# Patient Record
Sex: FEMALE | NOT HISPANIC OR LATINO | URBAN - METROPOLITAN AREA
[De-identification: names, ages, dates, MRNs, and addresses within clinical notes are randomized per-mention and may not be internally consistent; named-entity substitution may affect disease eponyms.]

---

## 2017-08-09 ENCOUNTER — CHARTING TRANS (OUTPATIENT)
Dept: URGENT CARE | Age: 67
End: 2017-08-09

## 2017-10-09 ENCOUNTER — LAB SERVICES (OUTPATIENT)
Dept: OTHER | Age: 67
End: 2017-10-09

## 2017-10-09 ENCOUNTER — CHARTING TRANS (OUTPATIENT)
Dept: OTHER | Age: 67
End: 2017-10-09

## 2017-10-09 LAB
BILIRUBIN URINE: NEGATIVE
BLOOD URINE: NEGATIVE
CLARITY: ABNORMAL
COLOR: ABNORMAL
GLUCOSE QUALITATIVE U: NEGATIVE
KETONES, URINE: NEGATIVE
LEUKOCYTE ESTERASE URINE: NEGATIVE
NITRITE URINE: NEGATIVE
PH URINE: 6 (ref 5–7)
SPECIFIC GRAVITY URINE: 1.02 (ref 1–1.03)
URINE PROTEIN, QUAL (DIPSTICK): NEGATIVE
UROBILINOGEN URINE: 0.2

## 2017-10-09 ASSESSMENT — PAIN SCALES - GENERAL: PAINLEVEL_OUTOF10: 5

## 2018-01-31 ENCOUNTER — CHARTING TRANS (OUTPATIENT)
Dept: OTHER | Age: 68
End: 2018-01-31

## 2018-01-31 ASSESSMENT — PAIN SCALES - GENERAL: PAINLEVEL_OUTOF10: 0

## 2018-11-28 VITALS
OXYGEN SATURATION: 96 % | RESPIRATION RATE: 18 BRPM | HEART RATE: 74 BPM | SYSTOLIC BLOOD PRESSURE: 134 MMHG | DIASTOLIC BLOOD PRESSURE: 90 MMHG | TEMPERATURE: 97.4 F

## 2018-11-28 VITALS
OXYGEN SATURATION: 97 % | SYSTOLIC BLOOD PRESSURE: 150 MMHG | HEART RATE: 91 BPM | RESPIRATION RATE: 20 BRPM | DIASTOLIC BLOOD PRESSURE: 90 MMHG | TEMPERATURE: 99.1 F

## 2019-03-06 VITALS
HEART RATE: 76 BPM | TEMPERATURE: 97 F | RESPIRATION RATE: 18 BRPM | DIASTOLIC BLOOD PRESSURE: 77 MMHG | OXYGEN SATURATION: 99 % | SYSTOLIC BLOOD PRESSURE: 145 MMHG

## 2020-01-21 PROBLEM — E66.01 CLASS 3 SEVERE OBESITY WITH SERIOUS COMORBIDITY AND BODY MASS INDEX (BMI) OF 40.0 TO 44.9 IN ADULT (HCC): Status: ACTIVE | Noted: 2019-01-22

## 2020-01-21 PROBLEM — Z88.9 H/O SEASONAL ALLERGIES: Status: ACTIVE | Noted: 2019-10-30

## 2020-01-21 PROBLEM — M26.622 ARTHRALGIA OF LEFT TEMPOROMANDIBULAR JOINT: Status: ACTIVE | Noted: 2019-10-30

## 2020-01-21 PROBLEM — E78.2 MIXED HYPERLIPIDEMIA: Status: ACTIVE | Noted: 2019-01-22

## 2020-01-21 PROBLEM — I10 ESSENTIAL HYPERTENSION, BENIGN: Status: ACTIVE | Noted: 2019-01-22

## 2020-10-19 ENCOUNTER — OFFICE VISIT (OUTPATIENT)
Dept: ORTHOPEDICS CLINIC | Facility: CLINIC | Age: 70
End: 2020-10-19
Payer: MEDICARE

## 2020-10-19 DIAGNOSIS — M17.12 PRIMARY OSTEOARTHRITIS OF LEFT KNEE: Primary | ICD-10-CM

## 2020-10-19 PROCEDURE — 99213 OFFICE O/P EST LOW 20 MIN: CPT | Performed by: ORTHOPAEDIC SURGERY

## 2020-10-19 PROCEDURE — 20610 DRAIN/INJ JOINT/BURSA W/O US: CPT | Performed by: ORTHOPAEDIC SURGERY

## 2020-10-19 RX ORDER — TRIAMCINOLONE ACETONIDE 40 MG/ML
40 INJECTION, SUSPENSION INTRA-ARTICULAR; INTRAMUSCULAR ONCE
Status: COMPLETED | OUTPATIENT
Start: 2020-10-19 | End: 2020-10-19

## 2020-10-19 RX ADMIN — TRIAMCINOLONE ACETONIDE 40 MG: 40 INJECTION, SUSPENSION INTRA-ARTICULAR; INTRAMUSCULAR at 17:36:00

## 2020-10-19 NOTE — PROGRESS NOTES
[facility] Progress Note      Chief Complaint: Current left knee pain the patient is a 39-year-old      History: Female returning due to persistent left-sided knee pain. Visco supplement injection was of minimal benefit.   She is holding for an joint line is more tender than lateral.  Extensor mechanism is intact. Yassine Hobby is negative and distal exam reveals minimal edema.     Assessment/Diagnoses:  Diagnoses and all orders for this visit:    Primary osteoarthritis of left knee  -     DRAIN/INJECT

## 2021-01-28 ENCOUNTER — PATIENT MESSAGE (OUTPATIENT)
Dept: ORTHOPEDICS CLINIC | Facility: CLINIC | Age: 71
End: 2021-01-28

## 2021-01-29 ENCOUNTER — PATIENT MESSAGE (OUTPATIENT)
Dept: ORTHOPEDICS CLINIC | Facility: CLINIC | Age: 71
End: 2021-01-29

## 2021-01-29 RX ORDER — METHYLPREDNISOLONE 4 MG/1
TABLET ORAL
Qty: 1 KIT | Refills: 0 | Status: SHIPPED | OUTPATIENT
Start: 2021-01-29 | End: 2021-02-10

## 2021-01-29 NOTE — TELEPHONE ENCOUNTER
Pramod Sneed called patient to let her know that we sent a RX to the pharmacy on file for a Medrol Dose pack since her cardiologist confirmed it was ok.

## 2021-01-29 NOTE — TELEPHONE ENCOUNTER
From: Keisha Helms  To: Key Moore MD  Sent: 1/28/2021 9:47 AM CST  Subject: Other    Good Morning Dr. Roderick Lemons you are doing well.    I seem to have a “pinched” nerve in my left upper buttock area - it responds to heat and ice however is still

## 2021-02-10 ENCOUNTER — OFFICE VISIT (OUTPATIENT)
Dept: ORTHOPEDICS CLINIC | Facility: CLINIC | Age: 71
End: 2021-02-10
Payer: MEDICARE

## 2021-02-10 ENCOUNTER — HOSPITAL ENCOUNTER (OUTPATIENT)
Dept: GENERAL RADIOLOGY | Age: 71
Discharge: HOME | End: 2021-02-10
Attending: PHYSICIAN ASSISTANT
Payer: MEDICARE

## 2021-02-10 DIAGNOSIS — M54.42 LEFT-SIDED LOW BACK PAIN WITH LEFT-SIDED SCIATICA, UNSPECIFIED CHRONICITY: ICD-10-CM

## 2021-02-10 DIAGNOSIS — M54.42 LEFT-SIDED LOW BACK PAIN WITH LEFT-SIDED SCIATICA, UNSPECIFIED CHRONICITY: Primary | ICD-10-CM

## 2021-02-10 PROCEDURE — 72110 X-RAY EXAM L-2 SPINE 4/>VWS: CPT | Performed by: PHYSICIAN ASSISTANT

## 2021-02-10 PROCEDURE — 99213 OFFICE O/P EST LOW 20 MIN: CPT | Performed by: PHYSICIAN ASSISTANT

## 2021-02-10 RX ORDER — AMLODIPINE BESYLATE 5 MG/1
5 TABLET ORAL DAILY
COMMUNITY
Start: 2020-12-18

## 2021-02-10 NOTE — PROGRESS NOTES
[facility] New Problem Note    CC: Low back pain    HPI: This 69year old female presents today with complaints of low back pain. Onset of symptoms started approximately 3 weeks ago with no known injury.   She does not relate any reaching, twisting, Breast Cancer Maternal Aunt      Social History    Occupational History      Not on file    Tobacco Use      Smoking status: Never Smoker      Smokeless tobacco: Never Used    Substance and Sexual Activity      Alcohol use: Yes        Frequency: 2-4 times going ahead with an MRI scan for possible preplanning of epidural steroid injections depending on the results. She would like to go to [place] [id number] for the MRI as she has been there before and it is closer to her house.   She will continue taking oral

## 2021-02-17 ENCOUNTER — OFFICE VISIT (OUTPATIENT)
Dept: ORTHOPEDICS CLINIC | Facility: CLINIC | Age: 71
End: 2021-02-17
Payer: MEDICARE

## 2021-02-17 DIAGNOSIS — M54.10 RADICULAR SYNDROME OF LEFT LEG: ICD-10-CM

## 2021-02-17 DIAGNOSIS — M51.26 BULGING LUMBAR DISC: Primary | ICD-10-CM

## 2021-02-17 PROCEDURE — 99213 OFFICE O/P EST LOW 20 MIN: CPT | Performed by: PHYSICIAN ASSISTANT

## 2021-02-17 NOTE — PROGRESS NOTES
[facility] Progress Note      Chief Complaint:  Low back and left leg pain      Subjective: Carey Langston is a 69year old female who is here for follow-up of her low back and left leg pain. She is here to review her MRI scan results today. Urmila Servin [id number]

## 2021-02-19 NOTE — TELEPHONE ENCOUNTER
From: Keisha Helms  Sent: 2/18/2021 5:15 PM CST  To: [facility] Clinical Pool  Subject: RE: Non-Urgent Medical Question    Nara Nolasco  I am scheduled at the pain clinic on Thursday - would you be so kind as to send the images from the MRI p

## 2021-07-16 ENCOUNTER — OFFICE VISIT (OUTPATIENT)
Dept: ORTHOPEDICS CLINIC | Facility: CLINIC | Age: 71
End: 2021-07-16
Payer: MEDICARE

## 2021-07-16 VITALS — BODY MASS INDEX: 38.71 KG/M2 | WEIGHT: 205 LBS | HEIGHT: 61 IN

## 2021-07-16 DIAGNOSIS — M79.89 LEFT LEG SWELLING: ICD-10-CM

## 2021-07-16 DIAGNOSIS — M17.12 PRIMARY OSTEOARTHRITIS OF LEFT KNEE: ICD-10-CM

## 2021-07-16 DIAGNOSIS — M65.9 SYNOVITIS OF LEFT KNEE: Primary | ICD-10-CM

## 2021-07-16 PROCEDURE — 20610 DRAIN/INJ JOINT/BURSA W/O US: CPT | Performed by: PHYSICIAN ASSISTANT

## 2021-07-16 PROCEDURE — 99213 OFFICE O/P EST LOW 20 MIN: CPT | Performed by: PHYSICIAN ASSISTANT

## 2021-07-16 RX ORDER — TRIAMCINOLONE ACETONIDE 40 MG/ML
40 INJECTION, SUSPENSION INTRA-ARTICULAR; INTRAMUSCULAR ONCE
Status: COMPLETED | OUTPATIENT
Start: 2021-07-16 | End: 2021-07-16

## 2021-07-16 RX ADMIN — TRIAMCINOLONE ACETONIDE 40 MG: 40 INJECTION, SUSPENSION INTRA-ARTICULAR; INTRAMUSCULAR at 13:53:00

## 2021-07-16 NOTE — PROGRESS NOTES
[facility] Progress Note      Chief Complaint: Left knee pain and lower leg swelling      Subjective: Levis Meigs is a 69year old female who is here for follow-up of her left knee degenerative arthritis.   She has been seen in the past and r beneficial but she states that this was very painful in the past and would like to just go ahead with a cortisone injection. I also recommend alternation of ice to the knee and warm compresses to the lower extremity to resolve the swelling.   She will also

## 2021-11-02 ENCOUNTER — PATIENT MESSAGE (OUTPATIENT)
Dept: ORTHOPEDICS CLINIC | Facility: CLINIC | Age: 71
End: 2021-11-02

## 2021-11-02 NOTE — TELEPHONE ENCOUNTER
From: Delories Del  To: Lisbeth Correia PA-C  Sent: 11/2/2021 2:40 PM CDT  Subject: Left Knee cortisone shot    Rosalind Waddella  I am bringing Sondra Hua in to see Dr. Quigley Console on Monday for post surgery follow up.   Is there any chance I can get a cortbetito

## 2021-11-02 NOTE — TELEPHONE ENCOUNTER
Future Appointments   Date Time Provider Beni Zamarripa   11/8/2021  4:00 PM Dm Rondon MD [facility]

## 2021-11-08 ENCOUNTER — OFFICE VISIT (OUTPATIENT)
Dept: ORTHOPEDICS CLINIC | Facility: CLINIC | Age: 71
End: 2021-11-08
Payer: MEDICARE

## 2021-11-08 VITALS — HEIGHT: 61 IN | BODY MASS INDEX: 35.87 KG/M2 | WEIGHT: 190 LBS

## 2021-11-08 DIAGNOSIS — M65.9 SYNOVITIS OF LEFT KNEE: ICD-10-CM

## 2021-11-08 DIAGNOSIS — M17.12 PRIMARY OSTEOARTHRITIS OF LEFT KNEE: Primary | ICD-10-CM

## 2021-11-08 PROCEDURE — 99213 OFFICE O/P EST LOW 20 MIN: CPT | Performed by: ORTHOPAEDIC SURGERY

## 2021-11-08 PROCEDURE — 20610 DRAIN/INJ JOINT/BURSA W/O US: CPT | Performed by: ORTHOPAEDIC SURGERY

## 2021-11-08 RX ORDER — TRIAMCINOLONE ACETONIDE 40 MG/ML
40 INJECTION, SUSPENSION INTRA-ARTICULAR; INTRAMUSCULAR ONCE
Status: COMPLETED | OUTPATIENT
Start: 2021-11-08 | End: 2021-11-08

## 2021-11-08 RX ADMIN — TRIAMCINOLONE ACETONIDE 40 MG: 40 INJECTION, SUSPENSION INTRA-ARTICULAR; INTRAMUSCULAR at 16:44:00

## 2021-11-08 NOTE — PROGRESS NOTES
[facility] Follow-up Progress Note        Chief Complaint:  Left knee pain     History:  The patient is a 66year old female who returns due to recurrent pain at the left knee.   The patient is diagnosed with osteoarthritis and has responded to injected 4 cc of 1% Xylocaine mixed with 40mg of Kenalog at the lateral patellofemoral joint of the left knee. The entire contents of the syringe were injected to minimal resistance.   The patient tolerated this well, a Band-Aid was applied, and harshal

## 2021-11-09 ENCOUNTER — PATIENT MESSAGE (OUTPATIENT)
Dept: ORTHOPEDICS CLINIC | Facility: CLINIC | Age: 71
End: 2021-11-09

## 2021-11-09 NOTE — TELEPHONE ENCOUNTER
From: Ivet Burrows  To: Tab Higgins MD  Sent: 11/9/2021 2:43 PM CST  Subject: Lenetta Soulier Dr Cheryal Blow  Please would you have your office fax the physical therapy orders for Anjali James to  [phone] Physical therapy    Thank you v

## 2022-06-27 ENCOUNTER — OFFICE VISIT (OUTPATIENT)
Dept: ORTHOPEDICS CLINIC | Facility: CLINIC | Age: 72
End: 2022-06-27
Payer: MEDICARE

## 2022-06-27 VITALS — BODY MASS INDEX: 35.87 KG/M2 | WEIGHT: 190 LBS | HEIGHT: 61 IN

## 2022-06-27 DIAGNOSIS — M17.12 PRIMARY OSTEOARTHRITIS OF LEFT KNEE: Primary | ICD-10-CM

## 2022-06-27 RX ORDER — TRIAMCINOLONE ACETONIDE 40 MG/ML
40 INJECTION, SUSPENSION INTRA-ARTICULAR; INTRAMUSCULAR ONCE
Status: COMPLETED | OUTPATIENT
Start: 2022-06-27 | End: 2022-06-27

## 2022-06-27 RX ORDER — CEPHALEXIN 500 MG/1
500 CAPSULE ORAL 2 TIMES DAILY
COMMUNITY
Start: 2022-06-20

## 2022-06-27 RX ADMIN — TRIAMCINOLONE ACETONIDE 40 MG: 40 INJECTION, SUSPENSION INTRA-ARTICULAR; INTRAMUSCULAR at 12:56:00

## 2022-08-08 ENCOUNTER — TELEPHONE (OUTPATIENT)
Dept: ORTHOPEDICS CLINIC | Facility: CLINIC | Age: 72
End: 2022-08-08

## 2022-08-08 DIAGNOSIS — M25.552 LEFT HIP PAIN: Primary | ICD-10-CM

## 2022-08-08 NOTE — TELEPHONE ENCOUNTER
Please enter an [address] for a LT HIP  for  this patient's upcoming appointment, as the patient has not had any imaging completed. Patient was instructed to get X-rays before appt. PLEASE REPLY TO THIS MESSAGE when the order is put in EPIC so that I can schedule the Xray appt. Thank you, in advance!     Future Appointments   Date Time Provider Beni Zamarripa   8/10/2022 10:30 AM Yasmany Irby PA-C [facility]

## 2022-08-08 NOTE — TELEPHONE ENCOUNTER
Thought the muscular type pain in her groin/hip was related to her knee, but the injection totalled improved knee. What should she do? She did have a fall on her bottom several months ago, but doesn't think that the pain is related. Message replayed to [facility], who suggested patient come in to see her.

## 2022-08-08 NOTE — TELEPHONE ENCOUNTER
Reviewed patients chart, xray orders are required. Order placed for left hip xrays.  Please contact patient advise to arrive 30 mins prior to patients appt to complete x-ray order and schedule patients xray appt-Thank you

## 2022-08-10 ENCOUNTER — HOSPITAL ENCOUNTER (OUTPATIENT)
Dept: GENERAL RADIOLOGY | Age: 72
Discharge: HOME | End: 2022-08-10
Attending: PHYSICIAN ASSISTANT
Payer: MEDICARE

## 2022-08-10 ENCOUNTER — OFFICE VISIT (OUTPATIENT)
Dept: ORTHOPEDICS CLINIC | Facility: CLINIC | Age: 72
End: 2022-08-10
Payer: MEDICARE

## 2022-08-10 VITALS — WEIGHT: 190 LBS | BODY MASS INDEX: 35.87 KG/M2 | HEIGHT: 61 IN

## 2022-08-10 DIAGNOSIS — M16.12 PRIMARY OSTEOARTHRITIS OF LEFT HIP: Primary | ICD-10-CM

## 2022-08-10 DIAGNOSIS — M25.552 LEFT HIP PAIN: ICD-10-CM

## 2022-08-10 PROCEDURE — 73502 X-RAY EXAM HIP UNI 2-3 VIEWS: CPT | Performed by: PHYSICIAN ASSISTANT

## 2022-08-10 PROCEDURE — 99213 OFFICE O/P EST LOW 20 MIN: CPT | Performed by: PHYSICIAN ASSISTANT

## 2022-08-10 NOTE — PROGRESS NOTES
[facility] Progress Note      Chief Complaint:  Left hip pain      Subjective: Maye Meadows is a 66year old female who is here for initial evaluation of her left hip. Onset of symptoms started approximately 3 months ago when she tripped and fell in her kitchen. She landed on the right side but tweaked the left hip. Since then she has noticed pain in the hip that radiates to the thigh and into the groin. It is worse with movement and after being seated for a long period of time. She does struggle from arthritis of her left knee that she does get injections from Dr. Venancio Alexandre occasionally. She recently got a cortisone injection in the knee is feeling better but the hip remains painful. For treatment, she has tried ice, and rest.  She is unable to take oral anti-inflammatories due to her hypertension. She is here for further evaluation. Objective: She ambulates with a moderately antalgic gait. Exam of the left hip and lower extremity reveals that she is nontender to palpation about the lateral hip. She has pain with rotation of the hip. Sensation is present to light touch. Imaging: X-rays of the left hip were independently read and radiologically reviewed. They show moderate joint space narrowing and marginal spurring. Assessment: Left hip degenerative joint disease      Plan: I discussed x-ray and exam findings with the patient are consistent with arthritis of the left hip. She may have exacerbated the arthritis during her fall. For treatment I recommend going ahead with continued conservative care including offloading the hip with a cane or crutch in the right hand. She can also use a walking stick. I also did offer her a cortisone injection intra-articularly under fluoroscopic guidance. She would like to go to [facility] and I did offer her to have an injection with Dr. Inocente Chicas. This will be scheduled at her earliest convenience.   I stated ultimately if symptoms or not improving with conservative care, total hip arthroplasty may be considered. She states that she is not looking for surgery as she needs a total knee replacement on the left side as well. She will follow-up with me with any recurrent or persistent symptoms or sooner with questions or concerns.       Jacobo Marina PA-C  [facility]

## 2022-08-12 ENCOUNTER — TELEPHONE (OUTPATIENT)
Dept: ORTHOPEDICS CLINIC | Facility: CLINIC | Age: 72
End: 2022-08-12

## 2022-08-12 NOTE — TELEPHONE ENCOUNTER
Called and spoke with Seng Blanchard to inform her that she is all scheduled for her hip injection under fluro with Dr José Miguel Coughlin on 8/22/22 at [facility]. She stated understanding and was appreciative.
[email or web address]

## 2022-09-09 ENCOUNTER — MED REC SCAN ONLY (OUTPATIENT)
Dept: ORTHOPEDICS CLINIC | Facility: CLINIC | Age: 72
End: 2022-09-09

## 2023-01-09 ENCOUNTER — PATIENT MESSAGE (OUTPATIENT)
Dept: ORTHOPEDICS CLINIC | Facility: CLINIC | Age: 73
End: 2023-01-09

## 2023-01-09 ENCOUNTER — OFFICE VISIT (OUTPATIENT)
Dept: ORTHOPEDICS CLINIC | Facility: CLINIC | Age: 73
End: 2023-01-09
Payer: MEDICARE

## 2023-01-09 VITALS — WEIGHT: 203 LBS | BODY MASS INDEX: 37.36 KG/M2 | HEIGHT: 62 IN

## 2023-01-09 DIAGNOSIS — M17.12 PRIMARY OSTEOARTHRITIS OF LEFT KNEE: Primary | ICD-10-CM

## 2023-01-09 RX ORDER — TRIAMCINOLONE ACETONIDE 40 MG/ML
40 INJECTION, SUSPENSION INTRA-ARTICULAR; INTRAMUSCULAR ONCE
Status: COMPLETED | OUTPATIENT
Start: 2023-01-09 | End: 2023-01-09

## 2023-01-09 RX ADMIN — TRIAMCINOLONE ACETONIDE 40 MG: 40 INJECTION, SUSPENSION INTRA-ARTICULAR; INTRAMUSCULAR at 14:52:00

## 2023-01-10 NOTE — TELEPHONE ENCOUNTER
From: Bhavesh Patel  To: Karine Tomlinson MD  Sent: 1/9/2023 6:41 PM CST  Subject: Visit today    Thank you Dr. Lita Villanueva - I will follow your guidance and determine what area has the greater pain impact.    It is always a pleasure to visit your office and your staff even when my knee is not at its best.  God bless you all  Monica Luciano

## 2023-01-17 NOTE — TELEPHONE ENCOUNTER
Future Appointments   Date Time Provider Beni Marilou   2/15/2023  2:20 PM Isabella Khan MD [facility]

## 2023-02-15 ENCOUNTER — OFFICE VISIT (OUTPATIENT)
Dept: ORTHOPEDICS CLINIC | Facility: CLINIC | Age: 73
End: 2023-02-15
Payer: MEDICARE

## 2023-02-15 VITALS — BODY MASS INDEX: 37.36 KG/M2 | WEIGHT: 203 LBS | HEIGHT: 62 IN

## 2023-02-15 DIAGNOSIS — M16.12 PRIMARY OSTEOARTHRITIS OF LEFT HIP: Primary | ICD-10-CM

## 2023-02-15 PROCEDURE — 99212 OFFICE O/P EST SF 10 MIN: CPT | Performed by: ORTHOPAEDIC SURGERY

## 2023-02-16 ENCOUNTER — PATIENT MESSAGE (OUTPATIENT)
Dept: ORTHOPEDICS CLINIC | Facility: CLINIC | Age: 73
End: 2023-02-16

## 2023-02-16 NOTE — TELEPHONE ENCOUNTER
From: Nirali Nash  To: Monica Corona PA-C  Sent: 2/16/2023 12:24 PM CST  Subject: Hip xrays    Nara Snowden I did not see you yesterday. Is it possible for you to arrange to have my left hip X-rays sent to Dr Leonor Gómez [place] - he is going to give me the shot as prescribed by Dr Adriana Magana and needs an X-ray and visit with me beforehand please. This way I am being taken care of by a physician I am already a patient with rather than a radiologist I have never met. Since Dr Adriana Magana no longer can give the shots at [place] so much closer [place] my home. I am very sorry to hear Homero Arroyo is leaving your office and had a great visit with Lore Osborne.     Thank you be safe and hope all is well with you   Alli Souza

## 2023-02-23 NOTE — PROGRESS NOTES
Patient is a 76-year-old female who have seen in the past is here for follow-up on her left hip. Patient has had a prior injection of her left hip by myself under fluoroscopy. That injection was helpful to her. Patient is having some recurrence of her pain. Patient's exam shows her to have a mildly antalgic gait on the left. Patient is pain with the extremes of motion of the left hip. Impression is that of persistent degenerative arthritis of the left hip. Explained to the patient that I am not able to do the injections at this time since I do not have privileges at [facility].  I advised her to go ahead and see one of the physiatrist or radiologist for an injection under fluoroscopy. I will put in an order prescription for that. Patient should follow-up with me as needed. If her symptoms persist or they recur with short duration of benefit from the injection then she might need to consider hip replacement surgery. Presently however her hip x-rays do not suggest that that would be imminent.

## (undated) NOTE — LETTER
08/10/22    Name: Cinda Moreno   : 1950  MD: Dr. Jennifer Hatfield      Dx: Primary osteoarthritis of left hip  Rx: Hip Injection Under Fluoroscopy  Side: [] Right  [x]  Left  [] Bilateral      Date of Procedure: 2022  Time: 10:30 AM  Facility: [facility] X-ray Department      Please have the following medications available for each hip:  4 cc 1%Lidocaine  4 cc 0.5% Marcaine  40 mg Kenalog            Jennifer Hatfield MD